# Patient Record
Sex: FEMALE | Race: WHITE | NOT HISPANIC OR LATINO | URBAN - METROPOLITAN AREA
[De-identification: names, ages, dates, MRNs, and addresses within clinical notes are randomized per-mention and may not be internally consistent; named-entity substitution may affect disease eponyms.]

---

## 2020-06-11 ENCOUNTER — OFFICE VISIT (OUTPATIENT)
Dept: URBAN - METROPOLITAN AREA CLINIC 113 | Facility: CLINIC | Age: 70
End: 2020-06-11

## 2020-06-12 ENCOUNTER — OFFICE VISIT (OUTPATIENT)
Dept: URBAN - METROPOLITAN AREA CLINIC 113 | Facility: CLINIC | Age: 70
End: 2020-06-12

## 2020-07-25 ENCOUNTER — TELEPHONE ENCOUNTER (OUTPATIENT)
Dept: URBAN - METROPOLITAN AREA CLINIC 13 | Facility: CLINIC | Age: 70
End: 2020-07-25

## 2020-07-25 RX ORDER — INSULIN GLULISINE 100 [IU]/ML
USE AS DIRECTED INJECTION, SOLUTION SUBCUTANEOUS
Refills: 0 | OUTPATIENT
End: 2017-11-30

## 2020-07-25 RX ORDER — GABAPENTIN 250 MG/5ML
TAKE 5 ML EVERY 12 HOURS DAILY SOLUTION ORAL
Refills: 0 | OUTPATIENT
Start: 2017-11-30 | End: 2019-07-31

## 2020-07-25 RX ORDER — PREDNISONE 5 MG/1
TAKE 1 TABLET AS DIRECTED TABLET ORAL
Refills: 0 | OUTPATIENT
Start: 2017-11-30 | End: 2019-07-31

## 2020-07-25 RX ORDER — PREDNISONE 10 MG/1
TAKE 1 TABLET DAILY TABLET ORAL
Qty: 30 | Refills: 2 | OUTPATIENT
Start: 2019-08-16 | End: 2019-10-11

## 2020-07-25 RX ORDER — OXAPROZIN 600 MG/1
TAKE 1 TABLET DAILY TABLET ORAL
Qty: 60 | Refills: 0 | OUTPATIENT
Start: 2013-06-27 | End: 2017-11-30

## 2020-07-25 RX ORDER — AMITRIPTYLINE HYDROCHLORIDE 25 MG/1
TAKE 1 TABLET AT BEDTIME TABLET, FILM COATED ORAL
Qty: 30 | Refills: 0 | OUTPATIENT
End: 2020-01-03

## 2020-07-25 RX ORDER — INSULIN LISPRO 100 [IU]/ML
USE AS DIRECTED INJECTION, SOLUTION INTRAVENOUS; SUBCUTANEOUS
Refills: 0 | OUTPATIENT
Start: 2007-12-03 | End: 2013-01-08

## 2020-07-25 RX ORDER — BUDESONIDE 3 MG/1
TAKE 3 CAPSULE DAILY CAPSULE, COATED PELLETS ORAL
Qty: 90 | Refills: 2 | OUTPATIENT
Start: 2019-07-31 | End: 2019-10-11

## 2020-07-25 RX ORDER — MESALAMINE 375 MG/1
TAKE 4 CAPSULES DAILY IN THE MORNING CAPSULE, EXTENDED RELEASE ORAL
Qty: 360 | Refills: 1 | OUTPATIENT
Start: 2019-08-07 | End: 2019-10-11

## 2020-07-26 ENCOUNTER — TELEPHONE ENCOUNTER (OUTPATIENT)
Dept: URBAN - METROPOLITAN AREA CLINIC 13 | Facility: CLINIC | Age: 70
End: 2020-07-26

## 2020-07-26 RX ORDER — OXAPROZIN 600 MG/1
TABLET ORAL
Qty: 60 | Refills: 0 | Status: ACTIVE | COMMUNITY
Start: 2011-07-24

## 2020-07-26 RX ORDER — CHLORHEXIDINE GLUCONATE 0.12% ORAL RINSE 1.2 MG/ML
SOLUTION ORAL
Qty: 255 | Refills: 0 | Status: ACTIVE | COMMUNITY
Start: 2012-08-16

## 2020-07-26 RX ORDER — BISMUTH SUBSALICYLATE 262 MG/1
TAKE 3 TABLET 3 TIMES DAILY TABLET, CHEWABLE ORAL
Qty: 270 | Refills: 2 | Status: ACTIVE | COMMUNITY
Start: 2019-10-11

## 2020-07-26 RX ORDER — ESTRADIOL 0.03 MG/D
PATCH TRANSDERMAL
Qty: 12 | Refills: 0 | Status: ACTIVE | COMMUNITY
Start: 2013-07-30

## 2020-07-26 RX ORDER — TIZANIDINE HYDROCHLORIDE 4 MG/1
TAKE 1 CAPSULE 3 TIMES DAILY PRN CAPSULE ORAL
Refills: 0 | Status: ACTIVE | COMMUNITY

## 2020-07-26 RX ORDER — AMOXICILLIN 875 MG/1
TABLET, FILM COATED ORAL
Qty: 14 | Refills: 0 | Status: ACTIVE | COMMUNITY
Start: 2011-09-01

## 2020-07-26 RX ORDER — ESTRADIOL 0.03 MG/D
PATCH TRANSDERMAL
Qty: 12 | Refills: 0 | Status: ACTIVE | COMMUNITY
Start: 2011-07-18

## 2020-07-26 RX ORDER — VENLAFAXINE HYDROCHLORIDE 75 MG/1
TAKE 1 CAPSULE BEDTIME CAPSULE, EXTENDED RELEASE ORAL
Refills: 0 | Status: ACTIVE | COMMUNITY

## 2020-07-26 RX ORDER — ESTRADIOL 0.03 MG/D
PATCH TRANSDERMAL
Qty: 12 | Refills: 0 | Status: ACTIVE | COMMUNITY
Start: 2012-12-10

## 2020-07-26 RX ORDER — OXAPROZIN 600 MG/1
TABLET ORAL
Qty: 60 | Refills: 0 | Status: ACTIVE | COMMUNITY
Start: 2012-05-23

## 2020-07-26 RX ORDER — GABAPENTIN 600 MG/1
TAKE 2 TABLET 3 TIMES DAILY TABLET, FILM COATED ORAL
Refills: 0 | Status: ACTIVE | COMMUNITY

## 2020-07-26 RX ORDER — IBUPROFEN 400 MG/1
TABLET, FILM COATED ORAL
Qty: 60 | Refills: 0 | Status: ACTIVE | COMMUNITY
Start: 2012-08-23

## 2020-07-26 RX ORDER — AZITHROMYCIN DIHYDRATE 250 MG/1
TABLET, FILM COATED ORAL
Qty: 6 | Refills: 0 | Status: ACTIVE | COMMUNITY
Start: 2012-06-25

## 2020-07-26 RX ORDER — IBUPROFEN 400 MG/1
TAKE 1 TABLET 3 TIMES DAILY AS NEEDED TABLET, FILM COATED ORAL
Refills: 0 | Status: ACTIVE | COMMUNITY

## 2020-07-26 RX ORDER — CHLORHEXIDINE GLUCONATE 0.12% ORAL RINSE 1.2 MG/ML
SOLUTION ORAL
Qty: 255 | Refills: 0 | Status: ACTIVE | COMMUNITY
Start: 2012-01-05

## 2020-07-26 RX ORDER — AMOXICILLIN 875 MG/1
TABLET, FILM COATED ORAL
Qty: 14 | Refills: 0 | Status: ACTIVE | COMMUNITY
Start: 2012-08-16

## 2020-07-26 RX ORDER — ALPRAZOLAM 0.25 MG/1
TABLET ORAL
Qty: 20 | Refills: 0 | Status: ACTIVE | COMMUNITY
Start: 2012-12-21

## 2020-07-26 RX ORDER — IBUPROFEN 400 MG/1
TABLET, FILM COATED ORAL
Qty: 60 | Refills: 0 | Status: ACTIVE | COMMUNITY
Start: 2013-08-23

## 2020-07-26 RX ORDER — OXAPROZIN 600 MG/1
TABLET ORAL
Qty: 60 | Refills: 0 | Status: ACTIVE | COMMUNITY
Start: 2013-06-27

## 2020-07-26 RX ORDER — VANCOMYCIN HYDROCHLORIDE 125 MG/1
TAKE 1 CAPSULE 3 TIMES DAILY FOR 10 DAYS, THEN 2 TIMES DAILY FOR 2 WEEKS, THEN ONCE DAILY FOR 2 WEEKS, THEN EVERY OTHER DAY FOR 2 WEEKS, D/C CAPSULE ORAL
Qty: 79 | Refills: 0 | Status: ACTIVE | COMMUNITY
Start: 2020-07-01

## 2020-07-26 RX ORDER — INSULIN GLULISINE 100 [IU]/ML
INJECTION, SOLUTION SUBCUTANEOUS
Qty: 20 | Refills: 0 | Status: ACTIVE | COMMUNITY
Start: 2012-02-24

## 2020-07-26 RX ORDER — CLOBETASOL PROPIONATE 0.5 MG/G
OINTMENT TOPICAL
Qty: 30 | Refills: 0 | Status: ACTIVE | COMMUNITY
Start: 2012-05-14

## 2020-07-26 RX ORDER — ESTRADIOL 0.03 MG/D
PATCH TRANSDERMAL
Qty: 12 | Refills: 0 | Status: ACTIVE | COMMUNITY
Start: 2011-10-21

## 2020-07-26 RX ORDER — BUDESONIDE 3 MG/1
TAKE 3 CAPSULES BY MOUTH EACH DAY IN THE MORNING CAPSULE, COATED PELLETS ORAL
Qty: 270 | Refills: 0 | Status: ACTIVE | COMMUNITY
Start: 2017-11-09

## 2020-07-26 RX ORDER — DIAZEPAM 5 MG/1
TABLET ORAL
Qty: 2 | Refills: 0 | Status: ACTIVE | COMMUNITY
Start: 2013-08-28

## 2020-12-22 ENCOUNTER — OFFICE VISIT (OUTPATIENT)
Dept: URBAN - METROPOLITAN AREA CLINIC 113 | Facility: CLINIC | Age: 70
End: 2020-12-22
Payer: MEDICARE

## 2020-12-22 VITALS
BODY MASS INDEX: 24.84 KG/M2 | DIASTOLIC BLOOD PRESSURE: 72 MMHG | TEMPERATURE: 98.4 F | HEART RATE: 59 BPM | WEIGHT: 135 LBS | SYSTOLIC BLOOD PRESSURE: 135 MMHG | HEIGHT: 62 IN

## 2020-12-22 DIAGNOSIS — R74.8 ELEVATED LIVER ENZYMES: ICD-10-CM

## 2020-12-22 DIAGNOSIS — K52.831 COLLAGENOUS COLITIS: ICD-10-CM

## 2020-12-22 PROCEDURE — 99213 OFFICE O/P EST LOW 20 MIN: CPT | Performed by: INTERNAL MEDICINE

## 2020-12-22 RX ORDER — HYDROCODONE BITARTRATE AND ACETAMINOPHEN 7.5; 325 MG/1; MG/1
1 TABLET AS NEEDED TABLET ORAL
Status: ACTIVE | COMMUNITY

## 2020-12-22 RX ORDER — TIZANIDINE HYDROCHLORIDE 4 MG/1
TAKE 1 CAPSULE 3 TIMES DAILY PRN CAPSULE ORAL
Refills: 0 | Status: ACTIVE | COMMUNITY

## 2020-12-22 RX ORDER — ACETAMINOPHEN 500 MG
1 CAPSULE TABLET ORAL
Status: ACTIVE | COMMUNITY

## 2020-12-22 RX ORDER — INSULIN LISPRO 100 [IU]/ML
AS DIRECTED INJECTION, SOLUTION INTRAVENOUS; SUBCUTANEOUS
Status: ACTIVE | COMMUNITY

## 2020-12-22 RX ORDER — TURMERIC 400 MG
AS DIRECTED CAPSULE ORAL
Status: ACTIVE | COMMUNITY

## 2020-12-22 RX ORDER — GABAPENTIN 600 MG/1
TAKE 2 TABLET 3 TIMES DAILY TABLET, FILM COATED ORAL
Refills: 0 | Status: ACTIVE | COMMUNITY

## 2020-12-22 RX ORDER — NIACIN 500 MG
1 TABLET WITH FOOD TABLET ORAL TWICE A DAY
Status: ACTIVE | COMMUNITY

## 2020-12-22 RX ORDER — MULTIVITAMIN
1 TABLET TABLET ORAL
Status: ACTIVE | COMMUNITY

## 2020-12-22 RX ORDER — UBIDECARENONE 60 MG
450 MG  1 CAPSULE WITH A MEAL CAPSULE ORAL ONCE A DAY
Status: ACTIVE | COMMUNITY

## 2020-12-22 RX ORDER — ASPIRIN 81 MG/1
1 TABLET TABLET ORAL ONCE A DAY
Status: ACTIVE | COMMUNITY

## 2020-12-22 RX ORDER — VENLAFAXINE HYDROCHLORIDE 75 MG/1
TAKE 1 CAPSULE BEDTIME CAPSULE, EXTENDED RELEASE ORAL
Refills: 0 | Status: ACTIVE | COMMUNITY

## 2020-12-22 NOTE — HPI-TODAY'S VISIT:
Ms. Petersen is a 70-year-old female with a history of multiple sclerosis, diabetes, hyperlipidemia, colon diverticulosis, and collagenous colitis presenting for long interval follow-up regarding liver enzyme elevation.  She was previously seen 1/3/2020 in follow-up regarding diarrhea secondary to collagenous colitis. She reported improvement of diarrhea using budesonide 9 mg daily. It was recommended she try to taper her budesonide to the lowest effective dose.  When she was seen most recently on 6/12/20, she had tapered off of the PVS I's and had noted recurrence of her diarrhea.  She is having more than 4 stools per day with occasional nocturnal defecation. Diarrhea was slowed but not resolved by Imodium and other generic anti-diarrheal pills. There was no fever, no chills or blood per rectum. There was mild lower abdominal cramping, precipitating a bowel movement.  The diarrhea was reminiscent of the collagenous colitis.   The plan at the time of her last visitwas as follows:  1. Stool studies: C. difficile, stool culture, ova and parasite, stool Giardia 2. Budesonide 9 mg daily. Follow-up in 4 weeks.   The diarrhea resolved after a course of budesonide and has not returned.  She is now off of that medication.  Recently, Dr. Mcallister obtained lab results on December 3, 2020showing an elevation of liver enzymes.  Specifically, she was noted have an alkaline phosphatase of 220, AST of 105, ALT of 101, and a total bilirubin of 0.2.  Her albumin was normal At 3.8.  GGT was elevated at 355.  Hepatitis serologies were negative.  Iron studies showed a normal ferritin and normal iron saturation.  Sedimentation rate was elevated at 33. She had a right upper quadrant ultrasound examination which showed fatty liver and a normal common bile duct diameter at 3 mm.  She denies any fever, chills, or sweats. She's had no jaundice or abdominal pain.  She's had no hepatitis exposure.  She only drinks alcohol socially.

## 2021-02-26 ENCOUNTER — TELEPHONE ENCOUNTER (OUTPATIENT)
Dept: URBAN - METROPOLITAN AREA CLINIC 113 | Facility: CLINIC | Age: 71
End: 2021-02-26

## 2021-04-14 ENCOUNTER — OFFICE VISIT (OUTPATIENT)
Dept: URBAN - METROPOLITAN AREA CLINIC 113 | Facility: CLINIC | Age: 71
End: 2021-04-14

## 2021-05-24 ENCOUNTER — OFFICE VISIT (OUTPATIENT)
Dept: URBAN - METROPOLITAN AREA CLINIC 113 | Facility: CLINIC | Age: 71
End: 2021-05-24
Payer: MEDICARE

## 2021-05-24 VITALS
RESPIRATION RATE: 20 BRPM | BODY MASS INDEX: 25.95 KG/M2 | SYSTOLIC BLOOD PRESSURE: 111 MMHG | WEIGHT: 141 LBS | HEART RATE: 64 BPM | TEMPERATURE: 98 F | HEIGHT: 62 IN | DIASTOLIC BLOOD PRESSURE: 60 MMHG

## 2021-05-24 DIAGNOSIS — K76.0 FATTY LIVER: ICD-10-CM

## 2021-05-24 DIAGNOSIS — R74.8 ELEVATED LIVER ENZYMES: ICD-10-CM

## 2021-05-24 DIAGNOSIS — K52.831 COLLAGENOUS COLITIS: ICD-10-CM

## 2021-05-24 DIAGNOSIS — R19.7 DIARRHEA, UNSPECIFIED TYPE: ICD-10-CM

## 2021-05-24 PROCEDURE — 99214 OFFICE O/P EST MOD 30 MIN: CPT | Performed by: INTERNAL MEDICINE

## 2021-05-24 RX ORDER — UBIDECARENONE 60 MG
450 MG  1 CAPSULE WITH A MEAL CAPSULE ORAL ONCE A DAY
Status: ACTIVE | COMMUNITY

## 2021-05-24 RX ORDER — ACETAMINOPHEN 500 MG
1 CAPSULE TABLET ORAL
Status: ACTIVE | COMMUNITY

## 2021-05-24 RX ORDER — MULTIVITAMIN
1 TABLET TABLET ORAL
Status: ACTIVE | COMMUNITY

## 2021-05-24 RX ORDER — INSULIN LISPRO 100 [IU]/ML
AS DIRECTED INJECTION, SOLUTION INTRAVENOUS; SUBCUTANEOUS
Status: ACTIVE | COMMUNITY

## 2021-05-24 RX ORDER — TIZANIDINE HYDROCHLORIDE 4 MG/1
TAKE 1 CAPSULE 3 TIMES DAILY PRN CAPSULE ORAL
Refills: 0 | Status: ACTIVE | COMMUNITY

## 2021-05-24 RX ORDER — GABAPENTIN 600 MG/1
TAKE 2 TABLET 3 TIMES DAILY TABLET, FILM COATED ORAL
Refills: 0 | Status: ACTIVE | COMMUNITY

## 2021-05-24 RX ORDER — ASPIRIN 81 MG/1
1 TABLET TABLET ORAL ONCE A DAY
Status: ACTIVE | COMMUNITY

## 2021-05-24 RX ORDER — TURMERIC 400 MG
AS DIRECTED CAPSULE ORAL
Status: ACTIVE | COMMUNITY

## 2021-05-24 RX ORDER — HYDROCODONE BITARTRATE AND ACETAMINOPHEN 7.5; 325 MG/1; MG/1
1 TABLET AS NEEDED TABLET ORAL
Status: ACTIVE | COMMUNITY

## 2021-05-24 RX ORDER — NIACIN 500 MG
1 TABLET WITH FOOD TABLET ORAL TWICE A DAY
Status: ON HOLD | COMMUNITY

## 2021-05-24 RX ORDER — VENLAFAXINE HYDROCHLORIDE 75 MG/1
TAKE 1 CAPSULE BEDTIME CAPSULE, EXTENDED RELEASE ORAL
Refills: 0 | Status: ACTIVE | COMMUNITY

## 2021-05-24 NOTE — HPI-TODAY'S VISIT:
This is a 71-year-old female with a history of multiple sclerosis, diabetes, hyperlipidemia, colon diverticulosis, collagenous colitis, and elevated liver enzymes presenting for follow-up.  She was last seen 12/22/2020.  She had previously been treated with budesonide for collagenous colitis.  After a course of therapy, diarrhea had resolved and had not recurred.  Labs in December showed liver enzyme elevation and right upper quadrant ultrasound demonstrated fatty liver.  She was instructed to discontinue niacin.  Although she had been on this medication for years, there was a possibility it may be contributing.  She was to stop social consumption of alcohol.   hepatitis serologies previously performed were negative.  Additional labs were ordered to assess for evidence of autoimmune liver disease.  Labs 12/29/2021: BMP normal with exception of glucose 183, potassium 5.7.  LFTs: TB 0.4, , , AST 88.  PT 10.3, INR 1.0.  Alpha-fetoprotein 6.8.  TSH 1.330.  L KM negative at 2.4.  Labs to assess for autoimmune liver disease were not resulted. She has only had 3 alcoholic beverages since January.  She has labs scheduled for Dr. Mcallister in 2 weeks. In the interval between visits, she tripped and fell and struck her head requiring admission to the hospital for 2 days.  Evaluation for brain pathology was negative.   in April, she had vertebroplasty. She reports onset of crampy lower abdominal pain with soft, unformed, multiple stools per day 3 weeks ago.  She denies changes in her medications or diet.  She denies the addition of supplements.  She is having 4-5 bowel movements per day.  She has infrequent nocturnal stools.  She denies antecedent antibiotic use.  She reports the symptoms are dissimilar to those that she has experienced with episodes of colitis.  She has milk in her coffee every morning.  Otherwise, dairy products are limited.

## 2021-06-02 ENCOUNTER — TELEPHONE ENCOUNTER (OUTPATIENT)
Dept: URBAN - METROPOLITAN AREA CLINIC 113 | Facility: CLINIC | Age: 71
End: 2021-06-02

## 2021-06-05 LAB
A/G RATIO: 1.2
ACTIN (SMOOTH MUSCLE) ANTIBODY: 10
ALBUMIN: 3.7
ALKALINE PHOSPHATASE: 139
ALT (SGPT): 43
ANTINUCLEAR ANTIBODIES, IFA: NEGATIVE
AST (SGOT): 48
BILIRUBIN, TOTAL: 0.2
BUN/CREATININE RATIO: 18
BUN: 14
C DIFFICILE TOXINS A+B, EIA: NEGATIVE
CALCIUM: 9.5
CARBON DIOXIDE, TOTAL: 27
CHLORIDE: 107
CREATININE: 0.79
EGFR IF AFRICN AM: 87
EGFR IF NONAFRICN AM: 76
GLOBULIN, TOTAL: 3.2
GLUCOSE: 108
Lab: (no result)
MITOCHONDRIAL (M2) ANTIBODY: <20
POTASSIUM: 4.8
PROTEIN, TOTAL: 6.9
SODIUM: 140

## 2021-06-06 ENCOUNTER — TELEPHONE ENCOUNTER (OUTPATIENT)
Dept: URBAN - METROPOLITAN AREA CLINIC 113 | Facility: CLINIC | Age: 71
End: 2021-06-06

## 2021-06-06 RX ORDER — TURMERIC 400 MG
AS DIRECTED CAPSULE ORAL
Status: ACTIVE | COMMUNITY

## 2021-06-06 RX ORDER — HYDROCODONE BITARTRATE AND ACETAMINOPHEN 7.5; 325 MG/1; MG/1
1 TABLET AS NEEDED TABLET ORAL
Status: ACTIVE | COMMUNITY

## 2021-06-06 RX ORDER — NIACIN 500 MG
1 TABLET WITH FOOD TABLET ORAL TWICE A DAY
Status: ON HOLD | COMMUNITY

## 2021-06-06 RX ORDER — MULTIVITAMIN
1 TABLET TABLET ORAL
Status: ACTIVE | COMMUNITY

## 2021-06-06 RX ORDER — VENLAFAXINE HYDROCHLORIDE 75 MG/1
TAKE 1 CAPSULE BEDTIME CAPSULE, EXTENDED RELEASE ORAL
Refills: 0 | Status: ACTIVE | COMMUNITY

## 2021-06-06 RX ORDER — GABAPENTIN 600 MG/1
TAKE 2 TABLET 3 TIMES DAILY TABLET, FILM COATED ORAL
Refills: 0 | Status: ACTIVE | COMMUNITY

## 2021-06-06 RX ORDER — INSULIN LISPRO 100 [IU]/ML
AS DIRECTED INJECTION, SOLUTION INTRAVENOUS; SUBCUTANEOUS
Status: ACTIVE | COMMUNITY

## 2021-06-06 RX ORDER — BUDESONIDE 3 MG/1
3 CAPSULES CAPSULE, COATED PELLETS ORAL ONCE A DAY
Qty: 90 | Refills: 2 | OUTPATIENT

## 2021-06-06 RX ORDER — ACETAMINOPHEN 500 MG
1 CAPSULE TABLET ORAL
Status: ACTIVE | COMMUNITY

## 2021-06-06 RX ORDER — ASPIRIN 81 MG/1
1 TABLET TABLET ORAL ONCE A DAY
Status: ACTIVE | COMMUNITY

## 2021-06-06 RX ORDER — TIZANIDINE HYDROCHLORIDE 4 MG/1
TAKE 1 CAPSULE 3 TIMES DAILY PRN CAPSULE ORAL
Refills: 0 | Status: ACTIVE | COMMUNITY

## 2021-06-06 RX ORDER — UBIDECARENONE 60 MG
450 MG  1 CAPSULE WITH A MEAL CAPSULE ORAL ONCE A DAY
Status: ACTIVE | COMMUNITY

## 2021-06-10 ENCOUNTER — TELEPHONE ENCOUNTER (OUTPATIENT)
Dept: URBAN - METROPOLITAN AREA CLINIC 113 | Facility: CLINIC | Age: 71
End: 2021-06-10

## 2021-08-25 ENCOUNTER — OFFICE VISIT (OUTPATIENT)
Dept: URBAN - METROPOLITAN AREA CLINIC 113 | Facility: CLINIC | Age: 71
End: 2021-08-25

## 2021-09-16 ENCOUNTER — OFFICE VISIT (OUTPATIENT)
Dept: URBAN - METROPOLITAN AREA CLINIC 113 | Facility: CLINIC | Age: 71
End: 2021-09-16
Payer: MEDICARE

## 2021-09-16 VITALS
HEART RATE: 55 BPM | TEMPERATURE: 96.8 F | WEIGHT: 153 LBS | HEIGHT: 62 IN | BODY MASS INDEX: 28.16 KG/M2 | DIASTOLIC BLOOD PRESSURE: 61 MMHG | SYSTOLIC BLOOD PRESSURE: 121 MMHG | RESPIRATION RATE: 20 BRPM

## 2021-09-16 DIAGNOSIS — K52.831 COLLAGENOUS COLITIS: ICD-10-CM

## 2021-09-16 DIAGNOSIS — R19.7 DIARRHEA, UNSPECIFIED TYPE: ICD-10-CM

## 2021-09-16 DIAGNOSIS — K76.0 FATTY LIVER: ICD-10-CM

## 2021-09-16 DIAGNOSIS — R74.8 ELEVATED LIVER ENZYMES: ICD-10-CM

## 2021-09-16 PROCEDURE — 99213 OFFICE O/P EST LOW 20 MIN: CPT | Performed by: NURSE PRACTITIONER

## 2021-09-16 RX ORDER — VENLAFAXINE HYDROCHLORIDE 75 MG/1
TAKE 1 CAPSULE BEDTIME CAPSULE, EXTENDED RELEASE ORAL
Refills: 0 | Status: ACTIVE | COMMUNITY

## 2021-09-16 RX ORDER — ACETAMINOPHEN 500 MG
1 CAPSULE TABLET ORAL
Status: ACTIVE | COMMUNITY

## 2021-09-16 RX ORDER — ASPIRIN 81 MG/1
1 TABLET TABLET ORAL ONCE A DAY
Status: ACTIVE | COMMUNITY

## 2021-09-16 RX ORDER — TIZANIDINE HYDROCHLORIDE 4 MG/1
TAKE 1 CAPSULE 3 TIMES DAILY PRN CAPSULE ORAL
Refills: 0 | Status: ACTIVE | COMMUNITY

## 2021-09-16 RX ORDER — HYDROCODONE BITARTRATE AND ACETAMINOPHEN 7.5; 325 MG/1; MG/1
1 TABLET AS NEEDED TABLET ORAL
Status: ACTIVE | COMMUNITY

## 2021-09-16 RX ORDER — BUDESONIDE 3 MG/1
3 CAPSULES CAPSULE, COATED PELLETS ORAL ONCE A DAY
Qty: 90 | Refills: 2 | Status: ON HOLD | COMMUNITY

## 2021-09-16 RX ORDER — TURMERIC 400 MG
AS DIRECTED CAPSULE ORAL
Status: ACTIVE | COMMUNITY

## 2021-09-16 RX ORDER — INSULIN LISPRO 100 [IU]/ML
AS DIRECTED INJECTION, SOLUTION INTRAVENOUS; SUBCUTANEOUS
Status: ACTIVE | COMMUNITY

## 2021-09-16 RX ORDER — NIACIN 500 MG
1 TABLET WITH FOOD TABLET ORAL TWICE A DAY
Status: ON HOLD | COMMUNITY

## 2021-09-16 RX ORDER — MULTIVITAMIN
1 TABLET TABLET ORAL
Status: ACTIVE | COMMUNITY

## 2021-09-16 RX ORDER — GABAPENTIN 600 MG/1
TAKE 2 TABLET 3 TIMES DAILY TABLET, FILM COATED ORAL
Refills: 0 | Status: ACTIVE | COMMUNITY

## 2021-09-16 RX ORDER — UBIDECARENONE 60 MG
450 MG  1 CAPSULE WITH A MEAL CAPSULE ORAL ONCE A DAY
Status: ACTIVE | COMMUNITY

## 2021-09-16 RX ORDER — EVOLOCUMAB 140 MG/ML
1 ML INJECTION, SOLUTION SUBCUTANEOUS
Status: ACTIVE | COMMUNITY

## 2021-09-16 NOTE — HPI-TODAY'S VISIT:
This is a 71-year-old female with a history of multiple sclerosis, diabetes, hyperlipidemia, colon diverticulosis, collagenous colitis, and elevated liver enzymes likely associated with fatty liver presenting for follow-up. She was last seen 5/24/2021.  She had previously been treated with budesonide for collagenous colitis.  After course of therapy, diarrhea had resolved and not recurred (2020).  Labs in December demonstrated liver enzyme elevation and an ultrasound showed fatty liver.  She had been instructed to discontinue niacin although she had been on that medication for years, it was possible that it was contributing.  She was to stop social alcohol consumption.  Hepatitis serologies were previously negative.  Additional labs have been ordered.  Autoimmune studies were not resulted.  At the time of her visit, she had only had 3 alcoholic beverages since January.  She had labs scheduled with her primary care physician 2 weeks after her visit.  She reported onset of crampy lower abdominal pain with soft, unformed, multiple stools beginning 3 weeks prior to her visit.  She denies changes in her medication or diet or the addition of supplements.  She was having 4-5 bowel movements per day with infrequent nocturnal stools.  She denied antecedent antibiotic use.  She reported her symptoms were dissimilar to those that she experienced with episodes of colitis.  She ingested a limited amount of dairy products.  She reported 2 episodes of hospital exposure and a stool was obtained for C. difficile.  Lactose intolerance, functional bowel disorder, and recurrence of collagenous colitis was within the differential.  She was to begin a dairy holiday and try daily fiber.  If symptoms persisted, she was to be prescribed budesonide.  Repeat CMP was recommended with upcoming labs as well as autoimmune studies.  She was to continue to minimize alcohol.  She had persistent diarrhea.  Stool for C. difficile was negative and labs were negative for autoimmune liver disease.  She was prescribed budesonide on 6/6/2021.  She called our office on 6/10 complaining of bloating and mildly distended abdomen.  Her stools were not formed but she did not have diarrhea.  She was instructed to add daily fiber and to try Gas-X or Phazyme as needed.  She took budesonide 9 mg daily for 3 months.  She stopped when she exhausted her supply a week ago.  Her bowel habits significantly improved shortly after she started the medication.  She reports persistent gas.  She is taking Benefiber every morning and takes Gas-X twice a day which provides adequate relief.  She denies any other abdominal symptoms.  She states she gained weight while on budesonide.  Our records reflect a 12 pound weight gain since her last visit.  She reports her blood glucose levels have been very low or as high as 140-350.  She recently acquired a new insulin pump and will follow up with her endocrinologist soon.  She infrequently has a glass of wine.  She had labs with Dr. Mcallister in June or July.

## 2021-09-16 NOTE — HPI-OTHER HISTORIES
Labs 5/24/2021 ASMA, AMA, MARCELINO negative.  Stool for C. difficile negative.  BMP normal with the exception of glucose 108, chloride 107.  LFTs: TB 0.2, , ALT 43, AST 48. Labs 12/29/2021:BMP normal with exception of glucose 183, potassium 5.7.  LFTs: TB 0.4, , , AST 88.  PT 10.3, INR 1.0.  Alpha-fetoprotein 6.8.  TSH 1.330.  L KM negative at 2.4. 12/3/2020:LFTs: TB 0.2, , , .  .  Hepatitis serologies negative.  Iron studies normal.  ESR 33. Abdominal ultrasound 12/15/2020:Hepatic steatosis, negative gallbladder and common bile duct.

## 2021-09-18 PROBLEM — 62315008: Status: ACTIVE | Noted: 2021-05-24

## 2022-03-16 ENCOUNTER — OFFICE VISIT (OUTPATIENT)
Dept: URBAN - METROPOLITAN AREA CLINIC 113 | Facility: CLINIC | Age: 72
End: 2022-03-16
Payer: MEDICARE

## 2022-03-16 VITALS — WEIGHT: 154 LBS

## 2022-03-16 DIAGNOSIS — K76.0 FATTY LIVER: ICD-10-CM

## 2022-03-16 DIAGNOSIS — R74.8 ELEVATED LIVER ENZYMES: ICD-10-CM

## 2022-03-16 DIAGNOSIS — K52.831 COLLAGENOUS COLITIS: ICD-10-CM

## 2022-03-16 PROCEDURE — 99213 OFFICE O/P EST LOW 20 MIN: CPT | Performed by: NURSE PRACTITIONER

## 2022-03-16 RX ORDER — ACETAMINOPHEN 500 MG
1 CAPSULE TABLET ORAL
Status: ACTIVE | COMMUNITY

## 2022-03-16 RX ORDER — MULTIVITAMIN
1 TABLET TABLET ORAL
Status: ACTIVE | COMMUNITY

## 2022-03-16 RX ORDER — INSULIN LISPRO 100 [IU]/ML
AS DIRECTED INJECTION, SOLUTION INTRAVENOUS; SUBCUTANEOUS
Status: ACTIVE | COMMUNITY

## 2022-03-16 RX ORDER — TIZANIDINE HYDROCHLORIDE 4 MG/1
1 CAPSULE CAPSULE ORAL TWICE DAILY
Refills: 0 | Status: ACTIVE | COMMUNITY

## 2022-03-16 RX ORDER — TURMERIC 400 MG
AS DIRECTED CAPSULE ORAL
Status: ACTIVE | COMMUNITY

## 2022-03-16 RX ORDER — GABAPENTIN 600 MG/1
TAKE 2 TABLET 3 TIMES DAILY TABLET, FILM COATED ORAL
Refills: 0 | Status: ACTIVE | COMMUNITY

## 2022-03-16 RX ORDER — BUDESONIDE 3 MG/1
3 CAPSULES CAPSULE, COATED PELLETS ORAL ONCE A DAY
Qty: 90 | Refills: 2 | Status: DISCONTINUED | COMMUNITY

## 2022-03-16 RX ORDER — VENLAFAXINE HYDROCHLORIDE 75 MG/1
TAKE 1 CAPSULE BEDTIME CAPSULE, EXTENDED RELEASE ORAL
Refills: 0 | Status: ACTIVE | COMMUNITY

## 2022-03-16 RX ORDER — EVOLOCUMAB 140 MG/ML
1 ML INJECTION, SOLUTION SUBCUTANEOUS
Status: DISCONTINUED | COMMUNITY

## 2022-03-16 RX ORDER — UBIDECARENONE 60 MG
450 MG  1 CAPSULE WITH A MEAL CAPSULE ORAL ONCE A DAY
Status: ACTIVE | COMMUNITY

## 2022-03-16 RX ORDER — NIACIN 500 MG
1 TABLET WITH FOOD TABLET ORAL TWICE A DAY
Status: DISCONTINUED | COMMUNITY

## 2022-03-16 RX ORDER — HYDROCODONE BITARTRATE AND ACETAMINOPHEN 7.5; 325 MG/1; MG/1
1 TABLET TWICE DAILY TABLET ORAL
Status: ACTIVE | COMMUNITY

## 2022-03-16 RX ORDER — ASPIRIN 81 MG/1
1 TABLET TABLET ORAL ONCE A DAY
Status: ACTIVE | COMMUNITY

## 2022-03-16 RX ORDER — LOSARTAN POTASSIUM 25 MG/1
1 TABLET TABLET ORAL ONCE A DAY
Status: ACTIVE | COMMUNITY

## 2022-03-16 NOTE — HPI-TODAY'S VISIT:
This is a 72-year-old female with a history of multiple sclerosis, diabetes, hyperlipidemia, colon diverticulosis, collagenous colitis, and elevated liver enzymes likely associated with fatty liver presenting for follow-up. She was last seen 9/16/2021.  She had previously experienced diarrhea.  Stool for C. difficile was negative.  Her symptoms resolved on budesonide suggesting her symptoms represented a flare of collagenous colitis.  She had completed a 3-month course and was asymptomatic.  She was to call if symptoms recur with a plan to restart medication and wean at the end of therapy.  She was instructed to continue efforts at glycemic control and weight reduction due to a history of liver enzyme elevation associated with fatty liver.  Labs were requested from her primary care physician for review. She has been evaluated by  for hypertension and renal insufficiency this months.  Losartan was added.  She reports a persistently elevated hemoglobin A1c which, with most recent labs, improved to 8.2.  She has been referred to Dr. Salomon for a rheumatological evaluation due to joint pain.  She continues to follow with [facility] regarding MS and is currently in physical therapy.  From a GI standpoint, she is doing well.  She is taking Benefiber daily and is having regular bowel movements.  She denies abdominal pain, heartburn, nausea, or any other abdominal symptoms.  Colonoscopy for screening is up-to-date and due in 2028.

## 2022-03-16 NOTE — HPI-OTHER HISTORIES
2/24/2022 CBC:WBC 7.7, hemoglobin 12.3, MCV 93.9, platelet 258.  CRP 3.0.  Ferritin 21.  Iron 86, iron saturation 29%.  Cholesterol elevated at 203 and LDL elevated at 115.  BMP normal with exception of glucose 126.  ESR 28.  Uric acid 4.3.  Vitamin D 25 hydroxy 46.  Urinalysis unremarkable.  Urine creatinine and microalbumin normal. 12/8/2021:Labs include hemoglobin A1c 8.5.  BMP normal with exception of creatinine 0.94.  LFTs: TB 0.4, , ALT 78, AST 77.  CBC: WBC 8, hemoglobin 11.8, MCV 91.8, platelet 242. Labs 5/24/2021 ASMA, AMA, MARCELINO negative.  Stool for C. difficile negative.  BMP normal with the exception of glucose 108, chloride 107.  LFTs: TB 0.2, , ALT 43, AST 48. Labs 12/29/2021:BMP normal with exception of glucose 183, potassium 5.7.  LFTs: TB 0.4, , , AST 88.  PT 10.3, INR 1.0.  Alpha-fetoprotein 6.8.  TSH 1.330.  L KM negative at 2.4. 12/3/2020:LFTs: TB 0.2, , , .  .  Hepatitis serologies negative.  Iron studies normal.  ESR 33. Abdominal ultrasound 12/15/2020:Hepatic steatosis, negative gallbladder and common bile duct.

## 2022-12-05 ENCOUNTER — LAB OUTSIDE AN ENCOUNTER (OUTPATIENT)
Dept: URBAN - METROPOLITAN AREA CLINIC 113 | Facility: CLINIC | Age: 72
End: 2022-12-05

## 2022-12-05 ENCOUNTER — TELEPHONE ENCOUNTER (OUTPATIENT)
Dept: URBAN - METROPOLITAN AREA CLINIC 113 | Facility: CLINIC | Age: 72
End: 2022-12-05

## 2022-12-06 PROBLEM — 197321007: Status: ACTIVE | Noted: 2021-05-24

## 2022-12-06 PROBLEM — 19311003: Status: ACTIVE | Noted: 2020-12-24

## 2022-12-06 PROBLEM — 707724006: Status: ACTIVE | Noted: 2021-05-24

## 2022-12-12 ENCOUNTER — TELEPHONE ENCOUNTER (OUTPATIENT)
Dept: URBAN - METROPOLITAN AREA CLINIC 113 | Facility: CLINIC | Age: 72
End: 2022-12-12

## 2023-02-07 ENCOUNTER — CLAIMS CREATED FROM THE CLAIM WINDOW (OUTPATIENT)
Dept: URBAN - METROPOLITAN AREA CLINIC 4 | Facility: CLINIC | Age: 73
End: 2023-02-07
Payer: MEDICARE

## 2023-02-07 ENCOUNTER — OFFICE VISIT (OUTPATIENT)
Dept: URBAN - METROPOLITAN AREA SURGERY CENTER 25 | Facility: SURGERY CENTER | Age: 73
End: 2023-02-07
Payer: MEDICARE

## 2023-02-07 DIAGNOSIS — R93.3 ABN FINDINGS-GI TRACT: ICD-10-CM

## 2023-02-07 DIAGNOSIS — K31.89 OTHER DISEASES OF STOMACH AND DUODENUM: ICD-10-CM

## 2023-02-07 DIAGNOSIS — K31.89 GASTRIC FOVEOLAR HYPERPLASIA: ICD-10-CM

## 2023-02-07 PROCEDURE — G8907 PT DOC NO EVENTS ON DISCHARG: HCPCS | Performed by: INTERNAL MEDICINE

## 2023-02-07 PROCEDURE — 88312 SPECIAL STAINS GROUP 1: CPT | Performed by: PATHOLOGY

## 2023-02-07 PROCEDURE — 88305 TISSUE EXAM BY PATHOLOGIST: CPT | Performed by: PATHOLOGY

## 2023-02-07 PROCEDURE — 43239 EGD BIOPSY SINGLE/MULTIPLE: CPT | Performed by: INTERNAL MEDICINE

## 2023-02-07 RX ORDER — UBIDECARENONE 60 MG
450 MG  1 CAPSULE WITH A MEAL CAPSULE ORAL ONCE A DAY
Status: ACTIVE | COMMUNITY

## 2023-02-07 RX ORDER — MULTIVITAMIN
1 TABLET TABLET ORAL
Status: ACTIVE | COMMUNITY

## 2023-02-07 RX ORDER — TIZANIDINE HYDROCHLORIDE 4 MG/1
1 CAPSULE CAPSULE ORAL TWICE DAILY
Refills: 0 | Status: ACTIVE | COMMUNITY

## 2023-02-07 RX ORDER — VENLAFAXINE HYDROCHLORIDE 75 MG/1
TAKE 1 CAPSULE BEDTIME CAPSULE, EXTENDED RELEASE ORAL
Refills: 0 | Status: ACTIVE | COMMUNITY

## 2023-02-07 RX ORDER — GABAPENTIN 600 MG/1
TAKE 2 TABLET 3 TIMES DAILY TABLET, FILM COATED ORAL
Refills: 0 | Status: ACTIVE | COMMUNITY

## 2023-02-07 RX ORDER — TURMERIC 400 MG
AS DIRECTED CAPSULE ORAL
Status: ACTIVE | COMMUNITY

## 2023-02-07 RX ORDER — HYDROCODONE BITARTRATE AND ACETAMINOPHEN 7.5; 325 MG/1; MG/1
1 TABLET TWICE DAILY TABLET ORAL
Status: ACTIVE | COMMUNITY

## 2023-02-07 RX ORDER — LOSARTAN POTASSIUM 25 MG/1
1 TABLET TABLET ORAL ONCE A DAY
Status: ACTIVE | COMMUNITY

## 2023-02-07 RX ORDER — ACETAMINOPHEN 500 MG
1 CAPSULE TABLET ORAL
Status: ACTIVE | COMMUNITY

## 2023-02-07 RX ORDER — INSULIN LISPRO 100 [IU]/ML
AS DIRECTED INJECTION, SOLUTION INTRAVENOUS; SUBCUTANEOUS
Status: ACTIVE | COMMUNITY

## 2023-02-07 RX ORDER — ASPIRIN 81 MG/1
1 TABLET TABLET ORAL ONCE A DAY
Status: ACTIVE | COMMUNITY

## 2023-03-20 ENCOUNTER — OFFICE VISIT (OUTPATIENT)
Dept: URBAN - METROPOLITAN AREA CLINIC 113 | Facility: CLINIC | Age: 73
End: 2023-03-20

## 2023-05-24 ENCOUNTER — OFFICE VISIT (OUTPATIENT)
Dept: URBAN - METROPOLITAN AREA CLINIC 113 | Facility: CLINIC | Age: 73
End: 2023-05-24
Payer: MEDICARE

## 2023-05-24 VITALS
DIASTOLIC BLOOD PRESSURE: 66 MMHG | WEIGHT: 165 LBS | RESPIRATION RATE: 16 BRPM | SYSTOLIC BLOOD PRESSURE: 148 MMHG | HEART RATE: 62 BPM | HEIGHT: 62 IN | BODY MASS INDEX: 30.36 KG/M2 | TEMPERATURE: 97.3 F

## 2023-05-24 DIAGNOSIS — K76.0 FATTY LIVER: ICD-10-CM

## 2023-05-24 DIAGNOSIS — K21.9 GASTROESOPHAGEAL REFLUX DISEASE WITHOUT ESOPHAGITIS: ICD-10-CM

## 2023-05-24 PROBLEM — 266435005: Status: ACTIVE | Noted: 2023-05-24

## 2023-05-24 PROCEDURE — 99214 OFFICE O/P EST MOD 30 MIN: CPT | Performed by: NURSE PRACTITIONER

## 2023-05-24 RX ORDER — PANTOPRAZOLE SODIUM 20 MG/1
1 TABLET TABLET, DELAYED RELEASE ORAL ONCE A DAY
Status: ACTIVE | COMMUNITY

## 2023-05-24 RX ORDER — ASPIRIN 81 MG/1
1 TABLET TABLET ORAL ONCE A DAY
Status: ON HOLD | COMMUNITY

## 2023-05-24 RX ORDER — PANTOPRAZOLE SODIUM 40 MG/1
1 TABLET TABLET, DELAYED RELEASE ORAL ONCE A DAY
Qty: 30 | Refills: 11 | OUTPATIENT
Start: 2023-05-24

## 2023-05-24 RX ORDER — MYCOPHENOLATE MOFETIL 500 MG/20ML
AS DIRECTED INJECTION, POWDER, LYOPHILIZED, FOR SOLUTION INTRAVENOUS
Status: ACTIVE | COMMUNITY

## 2023-05-24 RX ORDER — TIZANIDINE HYDROCHLORIDE 4 MG/1
1 CAPSULE CAPSULE ORAL TWICE DAILY
Refills: 0 | Status: ACTIVE | COMMUNITY

## 2023-05-24 RX ORDER — GABAPENTIN 600 MG/1
TAKE 2 TABLET 3 TIMES DAILY TABLET, FILM COATED ORAL
Refills: 0 | Status: ACTIVE | COMMUNITY

## 2023-05-24 RX ORDER — MULTIVITAMIN
1 TABLET TABLET ORAL
Status: ACTIVE | COMMUNITY

## 2023-05-24 RX ORDER — VENLAFAXINE HYDROCHLORIDE 75 MG/1
TAKE 1 CAPSULE BEDTIME CAPSULE, EXTENDED RELEASE ORAL
Refills: 0 | Status: ACTIVE | COMMUNITY

## 2023-05-24 RX ORDER — TURMERIC 400 MG
AS DIRECTED CAPSULE ORAL
Status: ACTIVE | COMMUNITY

## 2023-05-24 RX ORDER — LOSARTAN POTASSIUM 25 MG/1
1 TABLET TABLET ORAL ONCE A DAY
Status: ACTIVE | COMMUNITY

## 2023-05-24 RX ORDER — HYDROCODONE BITARTRATE AND ACETAMINOPHEN 7.5; 325 MG/1; MG/1
1 TABLET TWICE DAILY TABLET ORAL
Status: ON HOLD | COMMUNITY

## 2023-05-24 RX ORDER — ACETAMINOPHEN 500 MG
1 CAPSULE TABLET ORAL
Status: ACTIVE | COMMUNITY

## 2023-05-24 RX ORDER — UBIDECARENONE 60 MG
450 MG  1 CAPSULE WITH A MEAL CAPSULE ORAL ONCE A DAY
Status: ACTIVE | COMMUNITY

## 2023-05-24 RX ORDER — INSULIN LISPRO 100 [IU]/ML
AS DIRECTED INJECTION, SOLUTION INTRAVENOUS; SUBCUTANEOUS
Status: ACTIVE | COMMUNITY

## 2023-05-24 NOTE — HPI-OTHER HISTORIES
EGD 2/7/2023:Normal esophagus, small hiatal hernia, chronic gastritis status postbiopsy, normal examined duodenum.  Pathology: Antral biopsies demonstrated foveolar hyperplasia without evidence of H. pylori or intestinal metaplasia, dysplasia, or malignancy.  Colon cancer screening; due in 2018; recall set. 2/24/2022 CBC:WBC 7.7, hemoglobin 12.3, MCV 93.9, platelet 258.  CRP 3.0.  Ferritin 21.  Iron 86, iron saturation 29%.  Cholesterol elevated at 203 and LDL elevated at 115.  BMP normal with exception of glucose 126.  ESR 28.  Uric acid 4.3.  Vitamin D 25 hydroxy 46.  Urinalysis unremarkable.  Urine creatinine and microalbumin normal. 12/8/2021:Labs include hemoglobin A1c 8.5.  BMP normal with exception of creatinine 0.94.  LFTs: TB 0.4, , ALT 78, AST 77.  CBC: WBC 8, hemoglobin 11.8, MCV 91.8, platelet 242. Labs 5/24/2021 ASMA, AMA, MARCELINO negative.  Stool for C. difficile negative.  BMP normal with the exception of glucose 108, chloride 107.  LFTs: TB 0.2, , ALT 43, AST 48. Labs 12/29/2021:BMP normal with exception of glucose 183, potassium 5.7.  LFTs: TB 0.4, , , AST 88.  PT 10.3, INR 1.0.  Alpha-fetoprotein 6.8.  TSH 1.330.  L KM negative at 2.4. 12/3/2020:LFTs: TB 0.2, , , .  .  Hepatitis serologies negative.  Iron studies normal.  ESR 33. Abdominal ultrasound 12/15/2020:Hepatic steatosis, negative gallbladder and common bile duct.

## 2023-05-24 NOTE — HPI-TODAY'S VISIT:
This is a 73-year-old female with a history of multiple sclerosis, diabetes, hyperlipidemia, colon diverticulosis, collagenous colitis, elevated liver enzymes associated with fatty liver presenting for follow-up. She was last seen in the office 3/16/2022.  She was instructed to continue efforts at weight reduction and glycemic control due to a history of elevated liver enzymes associated with fatty liver.  Diarrhea associated with collagenous colitis has not recurred since her last course of budesonide.  She was having regular bowel movements on daily fiber which she was to continue. She had undergone a CT scan with her primary care physician in early December 2022 revealing a thickened gastric wall.  She was scheduled for an EGD. EGD 2/7/2023:Normal esophagus, small hiatal hernia, chronic gastritis status postbiopsy, normal examined duodenum.  Pathology: Antral biopsies demonstrated foveolar hyperplasia without evidence of H. pylori or intestinal metaplasia, dysplasia, or malignancy. Since her last office visit, she was hospitalized 3 times for organizing pneumonia.  She required a lung biopsy and has been under the care of a pulmonologist.  The offending organism was not identified.  Currently, her lungs are clear per her report.  She is taking mycophenolate.  The plan is that she should continue this medication for a year.  She has been experiencing worsening indigestion describing burning discomfort in the epigastrium relieved with Tums.  She has required frequent use.  2 months ago, Dr. Reich prescribed pantoprazole 20 mg daily.  Her symptoms have improved by 50%.  She denies dysphagia or heartburn.  She has occasional nausea without vomiting.  She has frequent formed bowel movements.  She denies red blood per rectum or melena.  She denies other abdominal symptoms.  She has gained weight due to steroid use for her pulmonary condition.

## 2023-05-29 ENCOUNTER — DASHBOARD ENCOUNTERS (OUTPATIENT)
Age: 73
End: 2023-05-29

## 2024-01-30 ENCOUNTER — TELEPHONE ENCOUNTER (OUTPATIENT)
Dept: URBAN - METROPOLITAN AREA CLINIC 23 | Facility: CLINIC | Age: 74
End: 2024-01-30

## 2024-06-03 ENCOUNTER — ERX REFILL RESPONSE (OUTPATIENT)
Dept: URBAN - METROPOLITAN AREA CLINIC 113 | Facility: CLINIC | Age: 74
End: 2024-06-03

## 2024-06-03 RX ORDER — PANTOPRAZOLE SODIUM 40 MG/1
1 TABLET TABLET, DELAYED RELEASE ORAL ONCE A DAY
Qty: 30 | Refills: 11 | OUTPATIENT

## 2024-06-03 RX ORDER — PANTOPRAZOLE 40 MG/1
TAKE 1 TABLET BY MOUTH DAILY TABLET, DELAYED RELEASE ORAL
Qty: 30 TABLET | Refills: 11 | OUTPATIENT

## 2024-07-18 ENCOUNTER — OFFICE VISIT (OUTPATIENT)
Dept: URBAN - METROPOLITAN AREA CLINIC 113 | Facility: CLINIC | Age: 74
End: 2024-07-18
Payer: MEDICARE

## 2024-07-18 VITALS
DIASTOLIC BLOOD PRESSURE: 69 MMHG | WEIGHT: 176.8 LBS | BODY MASS INDEX: 32.54 KG/M2 | HEART RATE: 60 BPM | HEIGHT: 62 IN | SYSTOLIC BLOOD PRESSURE: 179 MMHG | RESPIRATION RATE: 16 BRPM | TEMPERATURE: 97.2 F

## 2024-07-18 DIAGNOSIS — K76.0 FATTY LIVER: ICD-10-CM

## 2024-07-18 DIAGNOSIS — K21.9 GASTROESOPHAGEAL REFLUX DISEASE WITHOUT ESOPHAGITIS: ICD-10-CM

## 2024-07-18 DIAGNOSIS — R13.14 PHARYNGOESOPHAGEAL DYSPHAGIA: ICD-10-CM

## 2024-07-18 PROCEDURE — 99213 OFFICE O/P EST LOW 20 MIN: CPT | Performed by: NURSE PRACTITIONER

## 2024-07-18 RX ORDER — TURMERIC 400 MG
AS DIRECTED CAPSULE ORAL
Status: ACTIVE | COMMUNITY

## 2024-07-18 RX ORDER — GABAPENTIN 600 MG/1
TAKE 2 TABLET 3 TIMES DAILY TABLET, FILM COATED ORAL
Refills: 0 | Status: ACTIVE | COMMUNITY

## 2024-07-18 RX ORDER — TIZANIDINE HYDROCHLORIDE 4 MG/1
1 CAPSULE CAPSULE ORAL TWICE DAILY
Refills: 0 | Status: ACTIVE | COMMUNITY

## 2024-07-18 RX ORDER — MULTIVITAMIN
1 TABLET TABLET ORAL
Status: ACTIVE | COMMUNITY

## 2024-07-18 RX ORDER — PANTOPRAZOLE SODIUM 40 MG/1
1 TABLET TABLET, DELAYED RELEASE ORAL TWICE DAILY
Qty: 60 | Refills: 5 | OUTPATIENT
Start: 2024-07-18

## 2024-07-18 RX ORDER — UBIDECARENONE 60 MG
450 MG  1 CAPSULE WITH A MEAL CAPSULE ORAL ONCE A DAY
Status: ACTIVE | COMMUNITY

## 2024-07-18 RX ORDER — AZATHIOPRINE 75 MG/1
AS DIRECTED TABLET ORAL
Status: ACTIVE | COMMUNITY

## 2024-07-18 RX ORDER — ATOVAQUONE 750 MG/5ML
5 ML WITH FOOD SUSPENSION ORAL TWICE A DAY
Status: ACTIVE | COMMUNITY

## 2024-07-18 RX ORDER — LOSARTAN POTASSIUM 25 MG/1
1 TABLET TABLET ORAL ONCE A DAY
Status: ACTIVE | COMMUNITY

## 2024-07-18 RX ORDER — PANTOPRAZOLE 40 MG/1
TAKE 1 TABLET BY MOUTH DAILY TABLET, DELAYED RELEASE ORAL
Qty: 30 TABLET | Refills: 11 | Status: ACTIVE | COMMUNITY

## 2024-07-18 RX ORDER — ACETAMINOPHEN 500 MG
1 CAPSULE TABLET ORAL
Status: ACTIVE | COMMUNITY

## 2024-07-18 RX ORDER — VENLAFAXINE HYDROCHLORIDE 75 MG/1
TAKE 1 CAPSULE BEDTIME CAPSULE, EXTENDED RELEASE ORAL
Refills: 0 | Status: ON HOLD | COMMUNITY

## 2024-07-18 RX ORDER — HYDROCODONE BITARTRATE AND ACETAMINOPHEN 7.5; 325 MG/1; MG/1
1 TABLET TWICE DAILY TABLET ORAL
Status: ON HOLD | COMMUNITY

## 2024-07-18 RX ORDER — METHOTREXATE SODIUM 2.5 MG/1
AS DIRECTED TABLET ORAL
Status: ACTIVE | COMMUNITY

## 2024-07-18 RX ORDER — PREDNISONE 10 MG/1
1 TABLET ? STRENGTH TABLET ORAL AS DIRECTED
Status: ACTIVE | COMMUNITY

## 2024-07-18 RX ORDER — MYCOPHENOLATE MOFETIL 500 MG/20ML
AS DIRECTED INJECTION, POWDER, LYOPHILIZED, FOR SOLUTION INTRAVENOUS
Status: ON HOLD | COMMUNITY

## 2024-07-18 RX ORDER — ASPIRIN 81 MG/1
1 TABLET TABLET ORAL ONCE A DAY
Status: ON HOLD | COMMUNITY

## 2024-07-18 RX ORDER — PANTOPRAZOLE SODIUM 40 MG/1
1 TABLET TABLET, DELAYED RELEASE ORAL ONCE A DAY
Status: ACTIVE | COMMUNITY

## 2024-07-18 RX ORDER — INSULIN LISPRO 100 [IU]/ML
AS DIRECTED INJECTION, SOLUTION INTRAVENOUS; SUBCUTANEOUS
Status: ACTIVE | COMMUNITY

## 2024-07-18 NOTE — HPI-OTHER HISTORIES
Labs 1/25/2024: Lipid panel notable for cholesterol 255, , . Hemoglobin A1c 8.2. Vitamin D 25-hydroxy 37.6. BMP normal. LFTs normal: TB 0.4, ALP 72, ALT 20, AST 23. TSH 2.34. CBC: WBC 8.2, hemoglobin 11.8, MCV 95.8, platelet 349. Urinalysis unremarkable. Calculated Fib 4: 1.09 (low risk for fibrosis)  EGD 2/7/2023:Normal esophagus, small hiatal hernia, chronic gastritis status postbiopsy, normal examined duodenum.  Pathology: Antral biopsies demonstrated foveolar hyperplasia without evidence of H. pylori or intestinal metaplasia, dysplasia, or malignancy.  Colon cancer screening; due in 2018; recall set.  2/24/2022 CBC:WBC 7.7, hemoglobin 12.3, MCV 93.9, platelet 258.  CRP 3.0.  Ferritin 21.  Iron 86, iron saturation 29%.  Cholesterol elevated at 203 and LDL elevated at 115.  BMP normal with exception of glucose 126.  ESR 28.  Uric acid 4.3.  Vitamin D 25 hydroxy 46.  Urinalysis unremarkable.  Urine creatinine and microalbumin normal. 12/8/2021:Labs include hemoglobin A1c 8.5.  BMP normal with exception of creatinine 0.94.  LFTs: TB 0.4, , ALT 78, AST 77.  CBC: WBC 8, hemoglobin 11.8, MCV 91.8, platelet 242. Labs 5/24/2021 ASMA, AMA, MARCELINO negative.  Stool for C. difficile negative.  BMP normal with the exception of glucose 108, chloride 107.  LFTs: TB 0.2, , ALT 43, AST 48. Labs 12/29/2021:BMP normal with exception of glucose 183, potassium 5.7.  LFTs: TB 0.4, , , AST 88.  PT 10.3, INR 1.0.  Alpha-fetoprotein 6.8.  TSH 1.330.  L KM negative at 2.4. 12/3/2020:LFTs: TB 0.2, , , .  .  Hepatitis serologies negative.  Iron studies normal.  ESR 33. Abdominal ultrasound 12/15/2020:Hepatic steatosis, negative gallbladder and common bile duct.

## 2024-07-18 NOTE — HPI-TODAY'S VISIT:
This is a 74-year-old female with a history of multiple sclerosis, diabetes, hyperlipidemia, colon diverticulosis, collagenous colitis, elevated liver enzymes associated with fatty liver presenting for follow-up.  She was last seen in the office 5/24/2023.  She was complaining of worsening burning in the epigastrium.  It was determined this may be a side effect of mycophenolate.  Symptoms had improved by 50% on pantoprazole 20 mg daily.  She was prescribed pantoprazole 40 mg.  Liver enzyme elevation associated with fatty liver was discussed.  She had gained 11 pounds since her last visit.  Steroid use was likely playing a role.  She was off steroids.  She was to continue efforts at weight reduction.  Labs were requested from her primary care physician.  She is taking pantoprazole 40 mg daily.  She is having frequent breakthrough in the evenings requiring Tums.  She admits difficulty swallowing.  She has a sensation that food is hesitating at the base of her throat requiring 2 or 3 swallows in order to "move it through."  She has occasional nausea.  She denies vomiting.  She denies any other abdominal symptoms.  She states she is feeling unwell most days because of a history of organizing pneumonia.  She reports scarring throughout her lungs.  She has recurrent symptoms intermittently associated with medication changes.  Earlier this month, she was experiencing a flare.  Her physicians at [facility] had increased azathioprine to 75 mg daily.  She has follow-up with them monthly for the next 3 months and is having serial MRIs planned to assess her response.  She is undergoing multiple tests in September including evaluation to rule out contribution from acid reflux.  She is not sure regarding the exact test that are planned.

## 2024-10-08 ENCOUNTER — TELEPHONE ENCOUNTER (OUTPATIENT)
Dept: URBAN - METROPOLITAN AREA CLINIC 113 | Facility: CLINIC | Age: 74
End: 2024-10-08

## 2024-10-16 ENCOUNTER — LAB OUTSIDE AN ENCOUNTER (OUTPATIENT)
Dept: URBAN - METROPOLITAN AREA CLINIC 113 | Facility: CLINIC | Age: 74
End: 2024-10-16

## 2024-10-16 ENCOUNTER — OFFICE VISIT (OUTPATIENT)
Dept: URBAN - METROPOLITAN AREA CLINIC 113 | Facility: CLINIC | Age: 74
End: 2024-10-16
Payer: MEDICARE

## 2024-10-16 VITALS
RESPIRATION RATE: 16 BRPM | BODY MASS INDEX: 32.5 KG/M2 | TEMPERATURE: 97.2 F | WEIGHT: 176.6 LBS | SYSTOLIC BLOOD PRESSURE: 151 MMHG | DIASTOLIC BLOOD PRESSURE: 67 MMHG | HEART RATE: 60 BPM | HEIGHT: 62 IN

## 2024-10-16 DIAGNOSIS — K21.9 GASTROESOPHAGEAL REFLUX DISEASE WITHOUT ESOPHAGITIS: ICD-10-CM

## 2024-10-16 DIAGNOSIS — Z86.0100 HISTORY OF COLON POLYPS: ICD-10-CM

## 2024-10-16 DIAGNOSIS — R13.14 PHARYNGOESOPHAGEAL DYSPHAGIA: ICD-10-CM

## 2024-10-16 DIAGNOSIS — Z09 ENCOUNTER FOR FOLLOW-UP: ICD-10-CM

## 2024-10-16 PROBLEM — 428283002: Status: ACTIVE | Noted: 2024-10-16

## 2024-10-16 PROCEDURE — 99214 OFFICE O/P EST MOD 30 MIN: CPT | Performed by: NURSE PRACTITIONER

## 2024-10-16 RX ORDER — METHOTREXATE SODIUM 2.5 MG/1
AS DIRECTED TABLET ORAL
Status: ACTIVE | COMMUNITY

## 2024-10-16 RX ORDER — HYDROCODONE BITARTRATE AND ACETAMINOPHEN 7.5; 325 MG/1; MG/1
1 TABLET TWICE DAILY TABLET ORAL
Status: ON HOLD | COMMUNITY

## 2024-10-16 RX ORDER — MYCOPHENOLATE MOFETIL 500 MG/20ML
AS DIRECTED INJECTION, POWDER, LYOPHILIZED, FOR SOLUTION INTRAVENOUS
Status: ON HOLD | COMMUNITY

## 2024-10-16 RX ORDER — VENLAFAXINE HYDROCHLORIDE 75 MG/1
TAKE 1 CAPSULE BEDTIME CAPSULE, EXTENDED RELEASE ORAL
Refills: 0 | Status: ON HOLD | COMMUNITY

## 2024-10-16 RX ORDER — SODIUM, POTASSIUM,MAG SULFATES 17.5-3.13G
354ML SOLUTION, RECONSTITUTED, ORAL ORAL
Qty: 354 MILLILITER | Refills: 0 | OUTPATIENT
Start: 2024-10-16 | End: 2024-10-17

## 2024-10-16 RX ORDER — PANTOPRAZOLE 40 MG/1
TAKE 1 TABLET BY MOUTH DAILY TABLET, DELAYED RELEASE ORAL
Qty: 30 TABLET | Refills: 11 | Status: ACTIVE | COMMUNITY

## 2024-10-16 RX ORDER — GABAPENTIN 600 MG/1
TAKE 2 TABLET 3 TIMES DAILY TABLET, FILM COATED ORAL
Refills: 0 | Status: ACTIVE | COMMUNITY

## 2024-10-16 RX ORDER — PANTOPRAZOLE SODIUM 40 MG/1
1 TABLET TABLET, DELAYED RELEASE ORAL TWICE DAILY
Qty: 60 | Refills: 5 | Status: ACTIVE | COMMUNITY
Start: 2024-07-18

## 2024-10-16 RX ORDER — LOSARTAN POTASSIUM 25 MG/1
1 TABLET TABLET ORAL ONCE A DAY
Status: ACTIVE | COMMUNITY

## 2024-10-16 RX ORDER — INSULIN LISPRO 100 [IU]/ML
AS DIRECTED INJECTION, SOLUTION INTRAVENOUS; SUBCUTANEOUS
Status: ACTIVE | COMMUNITY

## 2024-10-16 RX ORDER — ASPIRIN 81 MG/1
1 TABLET TABLET ORAL ONCE A DAY
Status: ON HOLD | COMMUNITY

## 2024-10-16 RX ORDER — UBIDECARENONE 60 MG
450 MG  1 CAPSULE WITH A MEAL CAPSULE ORAL ONCE A DAY
Status: ACTIVE | COMMUNITY

## 2024-10-16 RX ORDER — MULTIVITAMIN
1 TABLET TABLET ORAL
Status: ACTIVE | COMMUNITY

## 2024-10-16 RX ORDER — TIZANIDINE HYDROCHLORIDE 4 MG/1
1 CAPSULE CAPSULE ORAL TWICE DAILY
Refills: 0 | Status: ACTIVE | COMMUNITY

## 2024-10-16 RX ORDER — PREDNISONE 10 MG/1
1 TABLET ? STRENGTH TABLET ORAL AS DIRECTED
Status: ACTIVE | COMMUNITY

## 2024-10-16 RX ORDER — ACETAMINOPHEN 500 MG
1 CAPSULE TABLET ORAL
Status: ACTIVE | COMMUNITY

## 2024-10-16 RX ORDER — PANTOPRAZOLE SODIUM 40 MG/1
1 TABLET TABLET, DELAYED RELEASE ORAL ONCE A DAY
Status: ACTIVE | COMMUNITY

## 2024-10-16 RX ORDER — ATOVAQUONE 750 MG/5ML
5 ML WITH FOOD SUSPENSION ORAL TWICE A DAY
Status: ACTIVE | COMMUNITY

## 2024-10-16 RX ORDER — TURMERIC 400 MG
AS DIRECTED CAPSULE ORAL
Status: ACTIVE | COMMUNITY

## 2024-10-16 RX ORDER — AZATHIOPRINE 75 MG/1
AS DIRECTED TABLET ORAL
Status: ACTIVE | COMMUNITY

## 2024-10-16 NOTE — HPI-TODAY'S VISIT:
This is a 74-year-old female with a history of multiple sclerosis, diabetes, hyperlipidemia, colon diverticulosis, collagenous colitis, elevated liver enzymes associated with fatty liver presenting for follow-up. She was last seen 7/18/2024.  She was compliant with pantoprazole 40 mg daily.  She had previously experienced abdominal pain.  This had resolved.  She reported worsening acid reflux symptoms in the evening requiring antacids.  She also describes pharyngoesophageal dysphagia.  EGD in February 2023 reviewed revealing a normal esophagus.  Esophageal dysmotility or cricopharyngeal hypertrophy within the differential.  She was undergoing extensive testing at [facility] in September including evaluation to determine if acid reflux was contributing to her pulmonary condition.  If dysphagia was not evaluated at [facility] and symptoms persisted, she was to call.  Barium swallow was a consideration.  Regarding fatty liver, liver enzymes in January were normal.  She had gained weight due to need for chronic steroids.  Calculated fib 4 was low at 1.09.  Continued follow-up was recommended. She called our office 10/8/2024 reporting that [facility] recommended a colonoscopy and EGD with [facility].  She requested that we perform these procedures so that she did not have to pay for a hotel room in [place].  This was not addressed further due to an upcoming appointment. She continues to experience intermittent symptoms consistent with pharyngoesophageal dysphagia.  This is increasing with frequency.  She is taking pantoprazole 40 mg twice a day and has occasional heartburn and regurgitation.  She has nausea associated with taking azathioprine.  She infrequently vomits.  She denies other abdominal symptoms. She reports her hemoglobin A1c has increased from 8.1-7.6.  She has labs with Dr. Mcallister every January.  She had recent labs with her rheumatologist at [facility]. Her physicians at [facility] have requested the EGD with [facility] pH testing, colonoscopy, and barium swallow.  She states her physicians are looking for anything that could contribute to history of organizing pneumonia. She has been diagnosed with stage II MS.  She is scheduled to begin physical therapy for this.  Most days, she feels unwell reporting a lack of energy and fatigue.

## 2024-10-23 ENCOUNTER — TELEPHONE ENCOUNTER (OUTPATIENT)
Dept: URBAN - METROPOLITAN AREA CLINIC 113 | Facility: CLINIC | Age: 74
End: 2024-10-23

## 2024-12-03 ENCOUNTER — OFFICE VISIT (OUTPATIENT)
Dept: URBAN - METROPOLITAN AREA SURGERY CENTER 25 | Facility: SURGERY CENTER | Age: 74
End: 2024-12-03
Payer: MEDICARE

## 2024-12-03 DIAGNOSIS — R13.19 OTHER DYSPHAGIA: ICD-10-CM

## 2024-12-03 DIAGNOSIS — K21.9 GASTRO-ESOPHAGEAL REFLUX DISEASE WITHOUT ESOPHAGITIS: ICD-10-CM

## 2024-12-03 DIAGNOSIS — K44.9 HIATAL HERNIA: ICD-10-CM

## 2024-12-03 PROCEDURE — 00731 ANES UPR GI NDSC PX NOS: CPT | Performed by: ANESTHESIOLOGY

## 2024-12-03 PROCEDURE — 43450 DILATE ESOPHAGUS 1/MULT PASS: CPT | Performed by: CLINIC/CENTER

## 2024-12-03 PROCEDURE — 43450 DILATE ESOPHAGUS 1/MULT PASS: CPT | Performed by: INTERNAL MEDICINE

## 2024-12-03 RX ORDER — AZATHIOPRINE 75 MG/1
AS DIRECTED TABLET ORAL
Status: ACTIVE | COMMUNITY

## 2024-12-03 RX ORDER — TURMERIC 400 MG
AS DIRECTED CAPSULE ORAL
Status: ACTIVE | COMMUNITY

## 2024-12-03 RX ORDER — MYCOPHENOLATE MOFETIL 500 MG/20ML
AS DIRECTED INJECTION, POWDER, LYOPHILIZED, FOR SOLUTION INTRAVENOUS
Status: ON HOLD | COMMUNITY

## 2024-12-03 RX ORDER — UBIDECARENONE 60 MG
450 MG  1 CAPSULE WITH A MEAL CAPSULE ORAL ONCE A DAY
Status: ACTIVE | COMMUNITY

## 2024-12-03 RX ORDER — INSULIN LISPRO 100 [IU]/ML
AS DIRECTED INJECTION, SOLUTION INTRAVENOUS; SUBCUTANEOUS
Status: ACTIVE | COMMUNITY

## 2024-12-03 RX ORDER — ATOVAQUONE 750 MG/5ML
5 ML WITH FOOD SUSPENSION ORAL TWICE A DAY
Status: ACTIVE | COMMUNITY

## 2024-12-03 RX ORDER — PREDNISONE 10 MG/1
1 TABLET ? STRENGTH TABLET ORAL AS DIRECTED
Status: ACTIVE | COMMUNITY

## 2024-12-03 RX ORDER — TIZANIDINE HYDROCHLORIDE 4 MG/1
1 CAPSULE CAPSULE ORAL TWICE DAILY
Refills: 0 | Status: ACTIVE | COMMUNITY

## 2024-12-03 RX ORDER — PANTOPRAZOLE SODIUM 40 MG/1
1 TABLET TABLET, DELAYED RELEASE ORAL TWICE DAILY
Qty: 60 | Refills: 5 | Status: ACTIVE | COMMUNITY
Start: 2024-07-18

## 2024-12-03 RX ORDER — ACETAMINOPHEN 500 MG
1 CAPSULE TABLET ORAL
Status: ACTIVE | COMMUNITY

## 2024-12-03 RX ORDER — MULTIVITAMIN
1 TABLET TABLET ORAL
Status: ACTIVE | COMMUNITY

## 2024-12-03 RX ORDER — VENLAFAXINE HYDROCHLORIDE 75 MG/1
TAKE 1 CAPSULE BEDTIME CAPSULE, EXTENDED RELEASE ORAL
Refills: 0 | Status: ON HOLD | COMMUNITY

## 2024-12-03 RX ORDER — ASPIRIN 81 MG/1
1 TABLET TABLET ORAL ONCE A DAY
Status: ON HOLD | COMMUNITY

## 2024-12-03 RX ORDER — GABAPENTIN 600 MG/1
TAKE 2 TABLET 3 TIMES DAILY TABLET, FILM COATED ORAL
Refills: 0 | Status: ACTIVE | COMMUNITY

## 2024-12-03 RX ORDER — PANTOPRAZOLE SODIUM 40 MG/1
1 TABLET TABLET, DELAYED RELEASE ORAL ONCE A DAY
Status: ACTIVE | COMMUNITY

## 2024-12-03 RX ORDER — METHOTREXATE SODIUM 2.5 MG/1
AS DIRECTED TABLET ORAL
Status: ACTIVE | COMMUNITY

## 2024-12-03 RX ORDER — PANTOPRAZOLE 40 MG/1
TAKE 1 TABLET BY MOUTH DAILY TABLET, DELAYED RELEASE ORAL
Qty: 30 TABLET | Refills: 11 | Status: ACTIVE | COMMUNITY

## 2024-12-03 RX ORDER — HYDROCODONE BITARTRATE AND ACETAMINOPHEN 7.5; 325 MG/1; MG/1
1 TABLET TWICE DAILY TABLET ORAL
Status: ON HOLD | COMMUNITY

## 2024-12-03 RX ORDER — LOSARTAN POTASSIUM 25 MG/1
1 TABLET TABLET ORAL ONCE A DAY
Status: ACTIVE | COMMUNITY

## 2024-12-05 ENCOUNTER — OFFICE VISIT (OUTPATIENT)
Dept: URBAN - METROPOLITAN AREA CLINIC 112 | Facility: CLINIC | Age: 74
End: 2024-12-05
Payer: MEDICARE

## 2024-12-05 DIAGNOSIS — K21.9 ACID REFLUX: ICD-10-CM

## 2024-12-05 PROCEDURE — 91035 G-ESOPH REFLX TST W/ELECTROD: CPT | Performed by: INTERNAL MEDICINE

## 2024-12-06 ENCOUNTER — TELEPHONE ENCOUNTER (OUTPATIENT)
Dept: URBAN - METROPOLITAN AREA CLINIC 113 | Facility: CLINIC | Age: 74
End: 2024-12-06

## 2024-12-20 ENCOUNTER — OFFICE VISIT (OUTPATIENT)
Dept: URBAN - METROPOLITAN AREA SURGERY CENTER 25 | Facility: SURGERY CENTER | Age: 74
End: 2024-12-20

## 2024-12-30 ENCOUNTER — TELEPHONE ENCOUNTER (OUTPATIENT)
Dept: URBAN - METROPOLITAN AREA CLINIC 113 | Facility: CLINIC | Age: 74
End: 2024-12-30

## 2025-01-02 ENCOUNTER — TELEPHONE ENCOUNTER (OUTPATIENT)
Dept: URBAN - METROPOLITAN AREA CLINIC 113 | Facility: CLINIC | Age: 75
End: 2025-01-02

## 2025-01-03 ENCOUNTER — OFFICE VISIT (OUTPATIENT)
Dept: URBAN - METROPOLITAN AREA SURGERY CENTER 25 | Facility: SURGERY CENTER | Age: 75
End: 2025-01-03

## 2025-01-16 ENCOUNTER — OFFICE VISIT (OUTPATIENT)
Dept: URBAN - METROPOLITAN AREA CLINIC 113 | Facility: CLINIC | Age: 75
End: 2025-01-16

## 2025-02-27 ENCOUNTER — OFFICE VISIT (OUTPATIENT)
Dept: URBAN - METROPOLITAN AREA CLINIC 113 | Facility: CLINIC | Age: 75
End: 2025-02-27

## 2025-02-27 VITALS
TEMPERATURE: 96.8 F | WEIGHT: 173 LBS | HEIGHT: 62 IN | HEART RATE: 67 BPM | BODY MASS INDEX: 31.83 KG/M2 | RESPIRATION RATE: 18 BRPM | DIASTOLIC BLOOD PRESSURE: 90 MMHG | SYSTOLIC BLOOD PRESSURE: 139 MMHG

## 2025-02-27 RX ORDER — METHOTREXATE SODIUM 2.5 MG/1
AS DIRECTED TABLET ORAL
Status: ACTIVE | COMMUNITY

## 2025-02-27 RX ORDER — PREDNISONE 10 MG/1
1 TABLET ? STRENGTH TABLET ORAL AS DIRECTED
Status: ON HOLD | COMMUNITY

## 2025-02-27 RX ORDER — MULTIVITAMIN
1 TABLET TABLET ORAL
Status: ACTIVE | COMMUNITY

## 2025-02-27 RX ORDER — ASPIRIN 81 MG/1
1 TABLET TABLET ORAL ONCE A DAY
Status: ON HOLD | COMMUNITY

## 2025-02-27 RX ORDER — EVOLOCUMAB 140 MG/ML
1 ML INJECTION, SOLUTION SUBCUTANEOUS
Status: ACTIVE | COMMUNITY
Start: 2025-02-27

## 2025-02-27 RX ORDER — MYCOPHENOLATE MOFETIL 500 MG/20ML
AS DIRECTED INJECTION, POWDER, LYOPHILIZED, FOR SOLUTION INTRAVENOUS
Status: ON HOLD | COMMUNITY

## 2025-02-27 RX ORDER — ROMOSOZUMAB-AQQG 105 MG/1.17ML
2.34 ML INJECTION, SOLUTION SUBCUTANEOUS
Status: ACTIVE | COMMUNITY
Start: 2025-02-27

## 2025-02-27 RX ORDER — PANTOPRAZOLE SODIUM 40 MG/1
1 TABLET TABLET, DELAYED RELEASE ORAL TWICE DAILY
Qty: 60 | Refills: 5 | Status: ACTIVE | COMMUNITY
Start: 2024-07-18

## 2025-02-27 RX ORDER — INSULIN LISPRO 100 [IU]/ML
AS DIRECTED INJECTION, SOLUTION INTRAVENOUS; SUBCUTANEOUS
Status: ACTIVE | COMMUNITY

## 2025-02-27 RX ORDER — HYDROCODONE BITARTRATE AND ACETAMINOPHEN 7.5; 325 MG/1; MG/1
1 TABLET TWICE DAILY TABLET ORAL
Status: ON HOLD | COMMUNITY

## 2025-02-27 RX ORDER — TIZANIDINE HYDROCHLORIDE 4 MG/1
1 CAPSULE CAPSULE ORAL TWICE DAILY
Refills: 0 | Status: ACTIVE | COMMUNITY

## 2025-02-27 RX ORDER — AZATHIOPRINE 75 MG/1
AS DIRECTED TABLET ORAL
Status: ACTIVE | COMMUNITY

## 2025-02-27 RX ORDER — TURMERIC 400 MG
AS DIRECTED CAPSULE ORAL
Status: ACTIVE | COMMUNITY

## 2025-02-27 RX ORDER — VENLAFAXINE HYDROCHLORIDE 75 MG/1
TAKE 1 CAPSULE BEDTIME CAPSULE, EXTENDED RELEASE ORAL
Refills: 0 | Status: ON HOLD | COMMUNITY

## 2025-02-27 RX ORDER — ATOVAQUONE 750 MG/5ML
5 ML WITH FOOD SUSPENSION ORAL TWICE A DAY
Status: ACTIVE | COMMUNITY

## 2025-02-27 RX ORDER — UBIDECARENONE 60 MG
450 MG  1 CAPSULE WITH A MEAL CAPSULE ORAL ONCE A DAY
Status: ACTIVE | COMMUNITY

## 2025-02-27 RX ORDER — LOSARTAN POTASSIUM 25 MG/1
1 TABLET TABLET ORAL ONCE A DAY
Status: ACTIVE | COMMUNITY

## 2025-02-27 RX ORDER — ACETAMINOPHEN 500 MG
1 CAPSULE TABLET ORAL
Status: ACTIVE | COMMUNITY

## 2025-02-27 RX ORDER — PANTOPRAZOLE SODIUM 40 MG/1
1 TABLET TABLET, DELAYED RELEASE ORAL ONCE A DAY
Status: DISCONTINUED | COMMUNITY

## 2025-02-27 RX ORDER — GABAPENTIN 600 MG/1
TAKE 2 TABLET 3 TIMES DAILY TABLET, FILM COATED ORAL
Refills: 0 | Status: ACTIVE | COMMUNITY

## 2025-02-27 NOTE — HPI-TODAY'S VISIT:
This is a 75-year-old female with a history of multiple sclerosis, diabetes, hyperlipidemia, colon diverticulosis, collagenous colitis, elevated liver enzymes associated with fatty liver, GERD, pharyngoesophageal dysphagia, and pneumonia with concern that reflux may be contributing presenting for follow-up after EGD.  She was last seen 10/16/2024.  She was compliant with pantoprazole 40 mg daily.  She was taking Tums for intermittent breakthrough.  She continues to describe pharyngoesophageal dysphagia that an increase in frequency.  She had a history of organizing pneumonia.  Her physician was concerned that reflux may be contributing.  EGD was ordered with [facility] pH testing per their recommendations.  Dilation was planned if indicated.  Barium swallow was ordered to assess for esophageal dysmotility.  Due to a history of colon polyps, she was due colonoscopy.  This was also scheduled.  Colonoscopy has been rescheduled on 2 occasions.  It is currently not scheduled.  EGD 12/3/2024:Regular Z-line, medium size hiatal hernia, no endoscopic abnormality to explain dysphagia status post empiric 46 [place] Shah dilation, normal stomach, normal examined duodenum.  No specimens were collected.  [facility] pH probe was placed. [facility] pH study was positive for pathologic reflux.  She is taking pantoprazole 40 mg daily.  She has reflux without heartburn occurring 2 or 3 times per week.  She denies any other abdominal symptoms.  She denies waking at night with reflux or with coughing.  She canceled her colonoscopy because she does not feel as though this is necessary.  This has been discussed at [facility].  She  is of the opinion this will not provide any answers to her current problems (organizing pneumonia and MS).  She does not wish to reschedule at this time.  She reports gagging with the first dose of bowel prep followed by nausea and vomiting and abdominal cramps for 4 to 5 days.  When it is time for her next colonoscopy, she prefers [facility].  She will call and make a follow-up appointment with her pulmonologist at [facility], Dr. Taran whitman.

## 2025-02-27 NOTE — HPI-OTHER HISTORIES
EGD 12/3/2024:Regular Z-line, medium size hiatal hernia, no endoscopic abnormality to explain dysphagia status post empiric 46 [place] Shah dilation, normal stomach, normal examined duodenum. No specimens were collected. [facility] pH probe was placed. [facility] pH study was positive for pathologic reflux.  Labs 1/25/2024: Lipid panel notable for cholesterol 255, , . Hemoglobin A1c 8.2. Vitamin D 25-hydroxy 37.6. BMP normal. LFTs normal: TB 0.4, ALP 72, ALT 20, AST 23. TSH 2.34. CBC: WBC 8.2, hemoglobin 11.8, MCV 95.8, platelet 349. Urinalysis unremarkable. Calculated Fib 4: 1.09 (low risk for fibrosis)  EGD 2/7/2023:Normal esophagus, small hiatal hernia, chronic gastritis status postbiopsy, normal examined duodenum.  Pathology: Antral biopsies demonstrated foveolar hyperplasia without evidence of H. pylori or intestinal metaplasia, dysplasia, or malignancy.   Colon cancer screening; due in 2018; recall set.  2/24/2022 CBC:WBC 7.7, hemoglobin 12.3, MCV 93.9, platelet 258.  CRP 3.0.  Ferritin 21.  Iron 86, iron saturation 29%.  Cholesterol elevated at 203 and LDL elevated at 115.  BMP normal with exception of glucose 126.  ESR 28.  Uric acid 4.3.  Vitamin D 25 hydroxy 46.  Urinalysis unremarkable.  Urine creatinine and microalbumin normal. 12/8/2021:Labs include hemoglobin A1c 8.5.  BMP normal with exception of creatinine 0.94.  LFTs: TB 0.4, , ALT 78, AST 77.  CBC: WBC 8, hemoglobin 11.8, MCV 91.8, platelet 242. Labs 5/24/2021 ASMA, AMA, MARCELINO negative.  Stool for C. difficile negative.  BMP normal with the exception of glucose 108, chloride 107.  LFTs: TB 0.2, , ALT 43, AST 48. Labs 12/29/2021:BMP normal with exception of glucose 183, potassium 5.7.  LFTs: TB 0.4, , , AST 88.  PT 10.3, INR 1.0.  Alpha-fetoprotein 6.8.  TSH 1.330.  L KM negative at 2.4. 12/3/2020:LFTs: TB 0.2, , , .  .  Hepatitis serologies negative.  Iron studies normal.  ESR 33. Abdominal ultrasound 12/15/2020:Hepatic steatosis, negative gallbladder and common bile duct.

## 2025-05-13 ENCOUNTER — TELEPHONE ENCOUNTER (OUTPATIENT)
Dept: URBAN - METROPOLITAN AREA CLINIC 113 | Facility: CLINIC | Age: 75
End: 2025-05-13

## 2025-05-22 ENCOUNTER — TELEPHONE ENCOUNTER (OUTPATIENT)
Dept: URBAN - METROPOLITAN AREA CLINIC 113 | Facility: CLINIC | Age: 75
End: 2025-05-22

## 2025-05-22 RX ORDER — PANTOPRAZOLE SODIUM 40 MG/1
1 TABLET TABLET, DELAYED RELEASE ORAL TWICE DAILY
Qty: 60 | Refills: 5
Start: 2024-07-18

## 2025-05-24 ENCOUNTER — TELEPHONE ENCOUNTER (OUTPATIENT)
Dept: URBAN - METROPOLITAN AREA CLINIC 113 | Facility: CLINIC | Age: 75
End: 2025-05-24

## 2025-05-25 ENCOUNTER — ERX REFILL RESPONSE (OUTPATIENT)
Dept: URBAN - METROPOLITAN AREA CLINIC 113 | Facility: CLINIC | Age: 75
End: 2025-05-25

## 2025-05-25 RX ORDER — PANTOPRAZOLE SODIUM 40 MG/1
1 TABLET TABLET, DELAYED RELEASE ORAL ONCE A DAY
Qty: 90 TABLET | Refills: 3 | OUTPATIENT

## 2025-08-27 ENCOUNTER — OFFICE VISIT (OUTPATIENT)
Dept: URBAN - METROPOLITAN AREA CLINIC 113 | Facility: CLINIC | Age: 75
End: 2025-08-27
Payer: MEDICARE

## 2025-08-27 DIAGNOSIS — R19.5 LOOSE STOOLS: ICD-10-CM

## 2025-08-27 DIAGNOSIS — K21.9 GASTROESOPHAGEAL REFLUX DISEASE WITHOUT ESOPHAGITIS: ICD-10-CM

## 2025-08-27 PROCEDURE — 99213 OFFICE O/P EST LOW 20 MIN: CPT | Performed by: NURSE PRACTITIONER

## 2025-08-27 RX ORDER — AZATHIOPRINE 75 MG/1
AS DIRECTED TABLET ORAL
Status: ACTIVE | COMMUNITY

## 2025-08-27 RX ORDER — MULTIVITAMIN
1 TABLET TABLET ORAL
Status: ACTIVE | COMMUNITY

## 2025-08-27 RX ORDER — ASPIRIN 81 MG/1
1 TABLET TABLET ORAL ONCE A DAY
Status: ON HOLD | COMMUNITY

## 2025-08-27 RX ORDER — UBIDECARENONE 60 MG
450 MG  1 CAPSULE WITH A MEAL CAPSULE ORAL ONCE A DAY
Status: ACTIVE | COMMUNITY

## 2025-08-27 RX ORDER — HYDROCODONE BITARTRATE AND ACETAMINOPHEN 7.5; 325 MG/1; MG/1
1 TABLET TWICE DAILY TABLET ORAL
Status: ON HOLD | COMMUNITY

## 2025-08-27 RX ORDER — TIZANIDINE HYDROCHLORIDE 4 MG/1
1 CAPSULE CAPSULE ORAL TWICE DAILY
Refills: 0 | Status: ACTIVE | COMMUNITY

## 2025-08-27 RX ORDER — GABAPENTIN 600 MG/1
TAKE 2 TABLET 3 TIMES DAILY TABLET, FILM COATED ORAL
Refills: 0 | Status: ACTIVE | COMMUNITY

## 2025-08-27 RX ORDER — ATOVAQUONE 750 MG/5ML
5 ML WITH FOOD SUSPENSION ORAL TWICE A DAY
Status: ACTIVE | COMMUNITY

## 2025-08-27 RX ORDER — LOSARTAN POTASSIUM 25 MG/1
1 TABLET TABLET ORAL ONCE A DAY
Status: ACTIVE | COMMUNITY

## 2025-08-27 RX ORDER — EVOLOCUMAB 140 MG/ML
1 ML INJECTION, SOLUTION SUBCUTANEOUS
Status: ACTIVE | COMMUNITY
Start: 2025-02-27

## 2025-08-27 RX ORDER — PANTOPRAZOLE SODIUM 40 MG/1
1 TABLET TABLET, DELAYED RELEASE ORAL ONCE A DAY
Qty: 90 TABLET | Refills: 3 | Status: ACTIVE | COMMUNITY

## 2025-08-27 RX ORDER — ACETAMINOPHEN 500 MG
1 CAPSULE TABLET ORAL
Status: ACTIVE | COMMUNITY

## 2025-08-27 RX ORDER — METHOTREXATE SODIUM 2.5 MG/1
AS DIRECTED TABLET ORAL
Status: ACTIVE | COMMUNITY

## 2025-08-27 RX ORDER — TURMERIC 400 MG
AS DIRECTED CAPSULE ORAL
Status: ACTIVE | COMMUNITY

## 2025-08-27 RX ORDER — MYCOPHENOLATE MOFETIL 500 MG/20ML
AS DIRECTED INJECTION, POWDER, LYOPHILIZED, FOR SOLUTION INTRAVENOUS
Status: ON HOLD | COMMUNITY

## 2025-08-27 RX ORDER — PREDNISONE 10 MG/1
1 TABLET ? STRENGTH TABLET ORAL AS DIRECTED
Status: ON HOLD | COMMUNITY

## 2025-08-27 RX ORDER — ROMOSOZUMAB-AQQG 105 MG/1.17ML
2.34 ML INJECTION, SOLUTION SUBCUTANEOUS
Status: ACTIVE | COMMUNITY
Start: 2025-02-27

## 2025-08-27 RX ORDER — INSULIN LISPRO 100 [IU]/ML
AS DIRECTED INJECTION, SOLUTION INTRAVENOUS; SUBCUTANEOUS
Status: ACTIVE | COMMUNITY

## 2025-08-27 RX ORDER — VENLAFAXINE HYDROCHLORIDE 75 MG/1
TAKE 1 CAPSULE BEDTIME CAPSULE, EXTENDED RELEASE ORAL
Refills: 0 | Status: ON HOLD | COMMUNITY

## 2025-08-30 PROBLEM — 398032003: Status: ACTIVE | Noted: 2025-08-30
